# Patient Record
Sex: MALE
[De-identification: names, ages, dates, MRNs, and addresses within clinical notes are randomized per-mention and may not be internally consistent; named-entity substitution may affect disease eponyms.]

---

## 2020-12-10 ENCOUNTER — HOSPITAL ENCOUNTER (OUTPATIENT)
Dept: HOSPITAL 47 - RADMRIMAIN | Age: 35
Discharge: HOME | End: 2020-12-10
Attending: FAMILY MEDICINE
Payer: COMMERCIAL

## 2020-12-10 DIAGNOSIS — R90.89: Primary | ICD-10-CM

## 2020-12-10 DIAGNOSIS — G44.53: ICD-10-CM

## 2020-12-10 PROCEDURE — 70553 MRI BRAIN STEM W/O & W/DYE: CPT

## 2020-12-10 NOTE — MR
EXAMINATION TYPE: MR brain wo/w con

 

DATE OF EXAM: 12/10/2020

 

COMPARISON: None

 

HISTORY: 35 year-old male G44.53 [place] headaches, bilateral extremity weakness

 

TECHNIQUE: 

Multiplanar, multisequence images of the brain and brainstem is performed without and with IV contras
t, utilizing 9 mL intravenous Gadavist .

 

FINDINGS: 

Diffusion weighted images demonstrate no evidence of a recent infarct or other diffusion abnormality.
  

 

There is no extra-axial fluid collection.

 

T2/FLAIR weighted sequences show tiny punctate foci of bright signal within the subcortical region of
 the bifrontal lobes, approximately 8 on each side.

 

The ventricular system and cisternal spaces are normal in size and appearance.  The brain volume is a
ge appropriate.

 

Midline structures demonstrate normal morphology.  The craniocervical junction appears within normal 
limits.  

 

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

 

Post contrast images demonstrate no abnormal enhancement. The dural venous sinuses appear patent. 

 

Mild/moderate mucosal thickening ethmoid air cells and mild within the right maxillary and right fron
oli sinus. Leftward nasal septal deviation. Globes are intact.

 

 

IMPRESSION: 

1. No acute intracranial abnormality seen.

2. Punctate foci of bright signal within the subcortical region of the bifrontal lobes, approximately
 8 on each side. Given the patient's symptoms, consider the possibility of chronic migraines or early
 demyelinating disease.

3. Mild chronic paranasal sinus disease.